# Patient Record
Sex: FEMALE | Race: WHITE | URBAN - METROPOLITAN AREA
[De-identification: names, ages, dates, MRNs, and addresses within clinical notes are randomized per-mention and may not be internally consistent; named-entity substitution may affect disease eponyms.]

---

## 2022-08-09 ENCOUNTER — HOSPITAL ENCOUNTER (EMERGENCY)
Age: 47
Discharge: LEFT BEFORE DISCHARGE | End: 2022-08-09

## 2024-04-25 ENCOUNTER — OFFICE VISIT (OUTPATIENT)
Dept: URGENT CARE | Age: 49
End: 2024-04-25

## 2024-04-25 VITALS
DIASTOLIC BLOOD PRESSURE: 81 MMHG | TEMPERATURE: 98 F | SYSTOLIC BLOOD PRESSURE: 135 MMHG | BODY MASS INDEX: 21.93 KG/M2 | WEIGHT: 123.8 LBS | OXYGEN SATURATION: 99 % | HEART RATE: 68 BPM

## 2024-04-25 DIAGNOSIS — S61.200A OPEN WOUND OF RIGHT INDEX FINGER: Primary | ICD-10-CM

## 2024-04-25 PROBLEM — R20.2 NUMBNESS AND TINGLING IN LEFT HAND: Status: RESOLVED | Noted: 2021-01-25 | Resolved: 2024-04-25

## 2024-04-25 PROBLEM — R20.0 NUMBNESS AND TINGLING IN LEFT HAND: Status: RESOLVED | Noted: 2021-01-25 | Resolved: 2024-04-25

## 2024-04-25 PROBLEM — M54.32 SCIATICA OF LEFT SIDE: Status: ACTIVE | Noted: 2021-01-25

## 2024-04-25 PROBLEM — Z86.39 HISTORY OF VITAMIN D DEFICIENCY: Status: ACTIVE | Noted: 2021-01-25

## 2024-04-25 PROBLEM — R73.03 PREDIABETES: Status: ACTIVE | Noted: 2021-01-25

## 2024-04-25 PROBLEM — F43.10 PTSD (POST-TRAUMATIC STRESS DISORDER): Status: ACTIVE | Noted: 2019-11-25

## 2024-04-25 PROBLEM — Z80.3 FAMILY HX-BREAST MALIGNANCY: Status: ACTIVE | Noted: 2021-01-25

## 2024-04-25 NOTE — PATIENT INSTRUCTIONS
Ibuprofen and acetaminophen for pain relief.  Keep wound covered for 12-24 hours. Then change dressings 1-2 times per day. After 3 days, can leave wound uncovered as long as keeping area clean.   After 3 days, can place a thin covering of Vaseline or topical antibiotic ointment over the wound.  No swimming or submerging the wound in water. However, normal showering and hand washing is fine.  Watch for signs of infection (such as swelling, increased tenderness, discharge, purulence, spreading red rash, heat), and return to the clinic should any develop.    Continue at-home monitoring of BP - recommend keeping a log of your blood pressures to discuss with your PCP.   Follow up with PCP to further evaluate your elevated blood pressures noted in clinic should you find your blood pressures staying elevated above 140s/90s.  If headaches, vision changes, chest pain, shortness of breath, back pain, abdominal pain, weakness, numbness, dizziness, lightheadedness, or any other concerns develop, follow up with the ER for further evaluation.

## 2024-04-25 NOTE — PROGRESS NOTES
Nyasia Wen (: 1975) is a 49 y.o. female, New patient, here for evaluation of the following chief complaint(s):  Finger Laceration (Patient cut her right index using a  yesterday. Up to date on TDAP.)      ASSESSMENT/PLAN:    ICD-10-CM    1. Open wound of right index finger  S61.200A           Exam of the open wound of the right index finger shows evidence of complete removal of the top layer of skin, exposing the dermal layer below. No concerns for retained foreign bodies, wound contamination, or involvement of the tissues below the dermis/fat layer. Attempts at approximating the wound edges were ineffective due to the amount of skin removed during the injury.  Given the inability to approximate the wound edges, wound closure via suturing or skin adhesives found impossible.  Wound cleaned in clinic, and dressed with nonadherent gauze.  Ibuprofen and acetaminophen for pain relief.  Wound care discussed  Return for signs of infection.    Pt's BP was noted to be significantly elevated during initial vital signs assessment - repeated BP assessments found continued decrease in the blood pressures. Given the pt's level of anxiety over her finger wound, elevated BPs likely the result of the pt's anxiety in the clinic setting.  Low concerns for hypertensive crisis or end organ damage at this time given pt's current symptoms and exam findings.  Discussed continued at-home monitoring of BP.  PCP follow up discussed for persistent elevation noted while at home.  Strict ED follow up instructions provided for any worsening HTN symptoms.      The patient tolerated their visit well. The patient and/or the family were informed of the results of any tests, a time was given to answer questions, a plan was proposed and they agreed with plan. Reviewed AVS with treatment instructions and answered questions - pt/family expresses understanding and agreement with the discussed treatment plan and AVS